# Patient Record
Sex: MALE | Race: WHITE | NOT HISPANIC OR LATINO | Employment: UNEMPLOYED | URBAN - METROPOLITAN AREA
[De-identification: names, ages, dates, MRNs, and addresses within clinical notes are randomized per-mention and may not be internally consistent; named-entity substitution may affect disease eponyms.]

---

## 2023-03-24 ENCOUNTER — HOSPITAL ENCOUNTER (OUTPATIENT)
Facility: HOSPITAL | Age: 48
Discharge: HOME | End: 2023-03-24
Attending: EMERGENCY MEDICINE | Admitting: EMERGENCY MEDICINE

## 2023-03-24 VITALS
BODY MASS INDEX: 28.32 KG/M2 | HEIGHT: 65 IN | TEMPERATURE: 98 F | SYSTOLIC BLOOD PRESSURE: 146 MMHG | HEART RATE: 70 BPM | DIASTOLIC BLOOD PRESSURE: 101 MMHG | RESPIRATION RATE: 18 BRPM | OXYGEN SATURATION: 99 % | WEIGHT: 170 LBS

## 2023-03-24 DIAGNOSIS — I10 ELEVATED BLOOD PRESSURE READING WITH DIAGNOSIS OF HYPERTENSION: Primary | ICD-10-CM

## 2023-03-24 PROCEDURE — G0463 HOSPITAL OUTPT CLINIC VISIT: HCPCS | Performed by: NURSE PRACTITIONER

## 2023-03-24 PROCEDURE — 99203 OFFICE O/P NEW LOW 30 MIN: CPT | Performed by: NURSE PRACTITIONER

## 2023-03-24 RX ORDER — AMLODIPINE BESYLATE 10 MG/1
10 TABLET ORAL DAILY
Qty: 30 TABLET | Refills: 0 | Status: SHIPPED | OUTPATIENT
Start: 2023-03-24 | End: 2023-04-23

## 2023-03-24 NOTE — [facility] PROVIDER NOTE
EMERGENCY DEPARTMENT ENCOUNTER    Room Number:  04/04  Date seen:  3/24/2023  Time seen: 15:24 EDT  PCP: Provider, No Known  Historian: patient    HPI:  Chief complaint:blood pressure medication refill  A complete HPI/ROS/PMH/PSH/SH/FH are unobtainable due to: n/a  Context:Cyndee Solitario is a 47 y.o. male from [place], newer to our area who presents to the ED with c/o being out of his BP medication, Norvasc 10 mg.  He has not yet established primary care here.  He has no complaints.  Denies chest pain/tightness, lightheadedness, headache, blurry vision.       Social determinants of health which may impact assessment: n/a    Review of prior external notes (non-ED):n/a    Review of prior external test results outside of this encounter: n/a    ALLERGIES  Patient has no known allergies.    PAST MEDICAL HISTORY  Active Ambulatory Problems     Diagnosis Date Noted   • No Active Ambulatory Problems     Resolved Ambulatory Problems     Diagnosis Date Noted   • No Resolved Ambulatory Problems     No Additional Past Medical History       PAST SURGICAL HISTORY  History reviewed. No pertinent surgical history.    FAMILY HISTORY  History reviewed. No pertinent family history.    SOCIAL HISTORY  Social History     Socioeconomic History   • Marital status: Single       REVIEW OF SYSTEMS  Review of Systems    All systems reviewed and negative except for those discussed in HPI.     PHYSICAL EXAM    I have reviewed the triage vital signs and nursing notes.  ED Triage Vitals   Temp Heart Rate Resp BP SpO2   03/24/23 1445 03/24/23 1445 03/24/23 1445 03/24/23 1446 03/24/23 1445   98 °F (36.7 °C) 70 18 (!) 177/115 99 %      Temp src Heart Rate Source Patient Position BP Location FiO2 (%)   03/24/23 1445 03/24/23 1445 03/24/23 1446 03/24/23 1446 --   Oral Monitor Sitting Right arm      Physical Exam    GENERAL: not distressed  HENT: nares patent, mm moist, no facial droop  EYES: no scleral icterus, PERRL  NECK: no ROM  limitations  CV: regular rhythm, regular rate,no murmur  RESPIRATORY: normal effort, CTAB  ABDOMEN: soft  : deferred  MUSCULOSKELETAL: no deformity  NEURO: alert, moves all extremities, follows commands  SKIN: warm, dry    PROGRESS, DATA ANALYSIS, CONSULTS AND MEDICAL DECISION MAKING    Please note that this section constitutes my independent interpretation of clinical data including lab results, radiology, EKG's.  This constitutes my independent professional opinion regarding differential diagnosis and management of this patient.  It may include any factors such as history from outside sources, review of external records, social determinants of health, management of medications, response to those treatments, and discussions with other providers.      ED Course as of 03/24/23 1531   Fri Mar 24, 2023   1525 Repeat BP with no intervention is 146/100 [EW]      ED Course User Index  [EW] Linda Adams APRN       Orders placed during this visit:  No orders of the defined types were placed in this encounter.             MDM pt here for refill of his norvasc 10 mg daily.  He has no complaints of chest pain/tightness, headache, blurry vision, or numbness.  He is not short of breath.  He provides empty bottle of Norvasc.  His repeat BP here is 146/100 with no treatment.  I will refill his Norvasc and help him follow up with a PCP      DIAGNOSIS  Final diagnoses:   Elevated blood pressure reading with diagnosis of hypertension       FOLLOW-UP  PATIENT CONNECTION - [facility] [id number]  [phone]  Schedule an appointment as soon as possible for a visit           Latest Documented Vital Signs:  As of 15:31 EDT  BP- (!) 146/101 HR- 70 Temp- 98 °F (36.7 °C) (Oral) O2 sat- 99%    Appropriate PPE utilized throughout this patient encounter to include mask and eye protection, per current protocol. Hand hygiene was performed before donning PPE and after removal when leaving the room.    Please note that portions of  this were completed with a voice recognition program.     Note Disclaimer: At [facility], we believe that sharing information builds trust and better relationships. You are receiving this note because you are receiving care at [facility] or recently visited. It is possible you will see health information before a provider has talked with you about it. This kind of information can be easy to misunderstand. To help you fully understand what it means for your health, we urge you to discuss this note with your provider.

## 2023-03-24 NOTE — DISCHARGE INSTRUCTIONS
Watch the salt in your diet    Take your blood pressure medication daily    Return Precautions    Although you are being discharged from the ED today, I encourage you to return for worsening symptoms.  Things can, and do, change such that treatment at home with medication may not be adequate.      Specifically, return for any of the following:    Chest pain, shortness of breath, pain or nausea and vomiting not controlled by medications provided.    Please make a follow up with your Primary Care Provider for a blood pressure recheck.